# Patient Record
(demographics unavailable — no encounter records)

---

## 2025-01-26 NOTE — PLAN
[FreeTextEntry1] : Physical   Benign HTN  EKG: Sinus Bradycardia, 56 bpm  Hx of Prostate cancer We'll check PSA today.   Bloodwork ordered.  Follow up in one week for lab results.

## 2025-01-26 NOTE — ADDENDUM
[FreeTextEntry1] : Documented by Sabrina Miller acting as a scribe for Dr. Murrell. 01/22/2025  All medical record entries made by the scribe were at my, Dr. Murrell, direction and personally dictated by me on 01/22/2025. I have reviewed the chart an agree that the record accurately reflects my personal performance of the history, physical exam, assessment and plan. I have also personally directed, reviewed, and agreed with the chart.

## 2025-01-26 NOTE — HISTORY OF PRESENT ILLNESS
[de-identified] : Mr. BALAJI NEELY is a 65 year old male with hx of Prostate cancer, kidney stone, presenting for an annual physical.   Pt states he is feeling well. Offers no complaints.  Pt states he is recently retired. BP elevated, he does not monitor BP regularly at home. denies HA at present.   Denies any SOB, CP, abdominal pain, N/V/D, dizziness, or leg swelling.

## 2025-01-26 NOTE — HEALTH RISK ASSESSMENT
[Good] : ~his/her~  mood as  good [No] : No [One fall no injury in past year] : Patient reported one fall in the past year without injury [Little interest or pleasure doing things] : 1) Little interest or pleasure doing things [Feeling down, depressed, or hopeless] : 2) Feeling down, depressed, or hopeless [0] : 2) Feeling down, depressed, or hopeless: Not at all (0) [PHQ-2 Negative - No further assessment needed] : PHQ-2 Negative - No further assessment needed [Time Spent: ___ Minutes] : I spent [unfilled] minutes performing a depression screening for this patient. [Never] : Never [None] : None [With Significant Other] : lives with significant other [# of Members in Household ___] :  household currently consist of [unfilled] member(s) [Employed] : employed [College] : College [] :  [# Of Children ___] : has [unfilled] children [Feels Safe at Home] : Feels safe at home [Fully functional (bathing, dressing, toileting, transferring, walking, feeding)] : Fully functional (bathing, dressing, toileting, transferring, walking, feeding) [Smoke Detector] : smoke detector [Carbon Monoxide Detector] : carbon monoxide detector [Seat Belt] :  uses seat belt [Sunscreen] : uses sunscreen [Patient/Caregiver not ready to engage] : , patient/caregiver not ready to engage [[id number]] : 0 [Change in mental status noted] : No change in mental status noted [Reports changes in vision] : Reports no changes in vision [Reports changes in dental health] : Reports no changes in dental health [Travel to Developing Areas] : does not  travel to developing areas [TB Exposure] : is not being exposed to tuberculosis [Caregiver Concerns] : does not have caregiver concerns [ColonoscopyDate] : 9/2023 [de-identified] : last eye exam 2025 [de-identified] : last dental 2024

## 2025-01-26 NOTE — HISTORY OF PRESENT ILLNESS
[de-identified] : Mr. BALAJI NEELY is a 65 year old male with hx of Prostate cancer, kidney stone, presenting for an annual physical.   Pt states he is feeling well. Offers no complaints.  Pt states he is recently retired. BP elevated, he does not monitor BP regularly at home. denies HA at present.   Denies any SOB, CP, abdominal pain, N/V/D, dizziness, or leg swelling.

## 2025-01-26 NOTE — HISTORY OF PRESENT ILLNESS
[de-identified] : Mr. BALAJI NEELY is a 65 year old male with hx of Prostate cancer, kidney stone, presenting for an annual physical.   Pt states he is feeling well. Offers no complaints.  Pt states he is recently retired. BP elevated, he does not monitor BP regularly at home. denies HA at present.   Denies any SOB, CP, abdominal pain, N/V/D, dizziness, or leg swelling.

## 2025-01-26 NOTE — HEALTH RISK ASSESSMENT
[Good] : ~his/her~  mood as  good [No] : No [One fall no injury in past year] : Patient reported one fall in the past year without injury [Little interest or pleasure doing things] : 1) Little interest or pleasure doing things [Feeling down, depressed, or hopeless] : 2) Feeling down, depressed, or hopeless [0] : 2) Feeling down, depressed, or hopeless: Not at all (0) [PHQ-2 Negative - No further assessment needed] : PHQ-2 Negative - No further assessment needed [Time Spent: ___ Minutes] : I spent [unfilled] minutes performing a depression screening for this patient. [Never] : Never [None] : None [With Significant Other] : lives with significant other [# of Members in Household ___] :  household currently consist of [unfilled] member(s) [Employed] : employed [College] : College [] :  [# Of Children ___] : has [unfilled] children [Feels Safe at Home] : Feels safe at home [Fully functional (bathing, dressing, toileting, transferring, walking, feeding)] : Fully functional (bathing, dressing, toileting, transferring, walking, feeding) [Smoke Detector] : smoke detector [Carbon Monoxide Detector] : carbon monoxide detector [Seat Belt] :  uses seat belt [Sunscreen] : uses sunscreen [Patient/Caregiver not ready to engage] : , patient/caregiver not ready to engage [[id number]] : 0 [Change in mental status noted] : No change in mental status noted [Reports changes in vision] : Reports no changes in vision [Reports changes in dental health] : Reports no changes in dental health [Travel to Developing Areas] : does not  travel to developing areas [TB Exposure] : is not being exposed to tuberculosis [ColonoscopyDate] : 9/2023 [Caregiver Concerns] : does not have caregiver concerns [de-identified] : last eye exam 2025 [de-identified] : last dental 2024

## 2025-01-26 NOTE — HEALTH RISK ASSESSMENT
[Good] : ~his/her~  mood as  good [No] : No [One fall no injury in past year] : Patient reported one fall in the past year without injury [Little interest or pleasure doing things] : 1) Little interest or pleasure doing things [Feeling down, depressed, or hopeless] : 2) Feeling down, depressed, or hopeless [0] : 2) Feeling down, depressed, or hopeless: Not at all (0) [PHQ-2 Negative - No further assessment needed] : PHQ-2 Negative - No further assessment needed [Time Spent: ___ Minutes] : I spent [unfilled] minutes performing a depression screening for this patient. [Never] : Never [None] : None [With Significant Other] : lives with significant other [# of Members in Household ___] :  household currently consist of [unfilled] member(s) [Employed] : employed [College] : College [] :  [# Of Children ___] : has [unfilled] children [Feels Safe at Home] : Feels safe at home [Fully functional (bathing, dressing, toileting, transferring, walking, feeding)] : Fully functional (bathing, dressing, toileting, transferring, walking, feeding) [Smoke Detector] : smoke detector [Carbon Monoxide Detector] : carbon monoxide detector [Seat Belt] :  uses seat belt [Sunscreen] : uses sunscreen [Patient/Caregiver not ready to engage] : , patient/caregiver not ready to engage [[id number]] : 0 [Change in mental status noted] : No change in mental status noted [Reports changes in vision] : Reports no changes in vision [Reports changes in dental health] : Reports no changes in dental health [Travel to Developing Areas] : does not  travel to developing areas [TB Exposure] : is not being exposed to tuberculosis [Caregiver Concerns] : does not have caregiver concerns [ColonoscopyDate] : 9/2023 [de-identified] : last eye exam 2025 [de-identified] : last dental 2024

## 2025-03-07 NOTE — HISTORY OF PRESENT ILLNESS
[FreeTextEntry1] : 65-year-old gentleman who presents for follow-up of hx of prostate cancer (completed radiation therapy approximately 2 years ago), microscopic hematuria, BPH presents for return of microscopic hematuria.  last PSA 1.06 Jan 2025 nocturia 2x night   He denies dysuria.  No flank pain or pain.  No gross hematuria.  No nausea or vomiting.  No other complaints.

## 2025-03-07 NOTE — ASSESSMENT
Please call with any questions or concerns:   [facility]  @ [phone]. LAB RESULTS:  Please obtain any labs or diagnostic tests as discussed today. You should call the office to check the results. Please allow  3 to 7 days for us to get these results. MEDICATION LIST:  Please bring an accurate list of your medications to every visit. APPOINTMENT CONFIRMATION:  We will call you the day before your scheduled appointment to confirm. If we are unable to reach you, you MUST call back by the end of the day to confirm the appointment or we may be forced to cancel. [FreeTextEntry1] : Very pleasant 65-year-old gentleman who presents for follow-up of prostate cancer status post radiation, microscopic hematuria, BPH -PSA 1.06 from January 2025 -Creatinine 0.98 -Urinalysis demonstrates small blood, 1 red blood cell per high-powered field, however in the past demonstrated 10 red blood cells per high-powered field  -Repeat urinalysis -urine culture -urine cytology -CT Urogram -cystoscopy -We discussed AUA guidelines regarding risk stratification for microscopic hematuria -Extensive discussion of the potential etiologies of hematuria, as well as the need to complete full work up for evaluation of cancer or other  sources of hematuria.  Patient understands and wishes to proceed.  Patient is being seen today for evaluation and management of a chronic and longitudinal ongoing condition and I am the primary treating physician

## 2025-05-19 NOTE — HISTORY OF PRESENT ILLNESS
[FreeTextEntry1] : 65-year-old gentleman who presents for follow-up of hx of prostate cancer (completed radiation therapy approximately 2 years ago), microscopic hematuria, BPH presents for follow-up of microscopic hematuria.  last PSA 1.06 Jan 2025 Urinalysis from March 2025 demonstrated negative nitrates, negative leukocyte esterase, 0 red blood cells per high-power field.  He denies dysuria.  No flank pain or pain.  No gross hematuria.  No nausea or vomiting.  No other complaints.
6

## 2025-05-19 NOTE — ASSESSMENT
[FreeTextEntry1] : Very pleasant 65-year-old gentleman who presents for follow-up of prostate cancer status post radiation, microscopic hematuria, BPH -PSA 1.06 from January 2025 -Creatinine 0.98 -Urinalysis demonstrates 0 red blood cells per high-powered field -urine culture negative -urine cytology negative -CT Urogram demonstrates a punctate 1 mm right midpole nonobstructing kidney stone as well as a punctate 1 mm right upper pole kidney stone but no renal masses, hydronephrosis.  It does demonstrate an enlarged prostate measuring approximately 54 cc - Patient was a no-show for cystoscopy.  I again recommended that he complete this examination however he wishes to forego this.  We discussed the risk of urologic malignancy and undiagnosed bladder cancer - Follow-up in 6 months with repeat urine studies and renal US  Patient is being seen today for evaluation and management of a chronic and longitudinal ongoing condition and I am the primary treating physician

## 2025-07-24 NOTE — ADDENDUM
[FreeTextEntry1] : Documented by Sabrina Miller acting as a scribe for Dr. Murrell. 07/24/2025   All medical record entries made by the scribe were at my, Dr. Murrell, direction and personally dictated by me on 07/24/2025. I have reviewed the chart an agree that the record accurately reflects my personal performance of the history, physical exam, assessment and plan. I have also personally directed, reviewed, and agreed with the chart.

## 2025-07-24 NOTE — PHYSICAL EXAM
[No Acute Distress] : no acute distress [Well Nourished] : well nourished [Well Developed] : well developed [Well-Appearing] : well-appearing [Normal Sclera/Conjunctiva] : normal sclera/conjunctiva [Normal Outer Ear/Nose] : the outer ears and nose were normal in appearance [Normal Oropharynx] : the oropharynx was normal [No JVD] : no jugular venous distention [No Lymphadenopathy] : no lymphadenopathy [Supple] : supple [No Respiratory Distress] : no respiratory distress  [No Accessory Muscle Use] : no accessory muscle use [Clear to Auscultation] : lungs were clear to auscultation bilaterally [Normal Rate] : normal rate  [Regular Rhythm] : with a regular rhythm [Normal S1, S2] : normal S1 and S2 [Pedal Pulses Present] : the pedal pulses are present [No Edema] : there was no peripheral edema [No Extremity Clubbing/Cyanosis] : no extremity clubbing/cyanosis [Soft] : abdomen soft [Non Tender] : non-tender [Non-distended] : non-distended [Normal Posterior Cervical Nodes] : no posterior cervical lymphadenopathy [Normal Anterior Cervical Nodes] : no anterior cervical lymphadenopathy [No CVA Tenderness] : no CVA  tenderness [No Joint Swelling] : no joint swelling [Grossly Normal Strength/Tone] : grossly normal strength/tone [No Rash] : no rash [Coordination Grossly Intact] : coordination grossly intact [No Focal Deficits] : no focal deficits [Normal Gait] : normal gait [Normal Affect] : the affect was normal [Normal Insight/Judgement] : insight and judgment were intact [de-identified] : + RT l/s spinal tenderness

## 2025-07-24 NOTE — PLAN
[FreeTextEntry1] : Follow up   RT sided low back pain musculoskeletal versus nephrolithiasis start heating pads and gentle stretches  Pt to follow up in one week or sooner if Sx persist or worsen. Urology Follow up   RT kidney stone, Prostate cancer  We'll check PSA, UA and urine culture today Follows with Urology   Benign HTN - BP borderline elevated  Reinforced the importance of following a low sodium diet.  Bloodwork ordered.  Follow up in one week for lab results.

## 2025-07-24 NOTE — HISTORY OF PRESENT ILLNESS
[de-identified] : Mr. BALAJI NEELY is a 66-year-old male with Hx of Prostate cancer s/p radiation 2023, kidney stone, presenting for a follow up on chronic problems.   Pt follows with Urology for prostate cancer  and RT midpole 1mm non-obstructing kidney stone and punctate 1 mm right upper pole kidney stone. He underwent radiation for prostate cancer 2 years ago.  Pt c/o RT sided lower back pain and discomfort. He denies radiation of pain to b/l LE and has been taking Tylenol for pain. Pt states he drinks water regularly and is taking B12 supplements.  He is otherwise feeling fine and denies any CP, SOB, or abdominal pain.